# Patient Record
Sex: FEMALE | Race: WHITE | NOT HISPANIC OR LATINO | Employment: OTHER | URBAN - METROPOLITAN AREA
[De-identification: names, ages, dates, MRNs, and addresses within clinical notes are randomized per-mention and may not be internally consistent; named-entity substitution may affect disease eponyms.]

---

## 2017-07-27 ENCOUNTER — HISTORICAL (OUTPATIENT)
Dept: ADMINISTRATIVE | Facility: HOSPITAL | Age: 72
End: 2017-07-27

## 2017-07-29 LAB — FINAL CULTURE: NORMAL

## 2022-08-16 ENCOUNTER — OFFICE VISIT (OUTPATIENT)
Dept: URGENT CARE | Facility: CLINIC | Age: 77
End: 2022-08-16
Payer: MEDICARE

## 2022-08-16 VITALS
WEIGHT: 169 LBS | HEART RATE: 62 BPM | OXYGEN SATURATION: 96 % | TEMPERATURE: 100 F | DIASTOLIC BLOOD PRESSURE: 85 MMHG | HEIGHT: 72 IN | BODY MASS INDEX: 22.89 KG/M2 | RESPIRATION RATE: 17 BRPM | SYSTOLIC BLOOD PRESSURE: 144 MMHG

## 2022-08-16 DIAGNOSIS — R50.9 FEVER, UNSPECIFIED FEVER CAUSE: Primary | ICD-10-CM

## 2022-08-16 DIAGNOSIS — U07.1 COVID: ICD-10-CM

## 2022-08-16 DIAGNOSIS — U07.1 COVID-19 VIRUS DETECTED: ICD-10-CM

## 2022-08-16 LAB
CTP QC/QA: YES
SARS-COV-2 RDRP RESP QL NAA+PROBE: POSITIVE

## 2022-08-16 PROCEDURE — U0002 COVID-19 LAB TEST NON-CDC: HCPCS | Mod: QW,CR,, | Performed by: FAMILY MEDICINE

## 2022-08-16 PROCEDURE — 99213 OFFICE O/P EST LOW 20 MIN: CPT | Mod: CR,,, | Performed by: FAMILY MEDICINE

## 2022-08-16 PROCEDURE — 99213 PR OFFICE/OUTPT VISIT, EST, LEVL III, 20-29 MIN: ICD-10-PCS | Mod: CR,,, | Performed by: FAMILY MEDICINE

## 2022-08-16 PROCEDURE — U0002: ICD-10-PCS | Mod: QW,CR,, | Performed by: FAMILY MEDICINE

## 2022-08-16 RX ORDER — MIRABEGRON 50 MG/1
1 TABLET, FILM COATED, EXTENDED RELEASE ORAL DAILY
COMMUNITY
Start: 2022-07-28

## 2022-08-16 RX ORDER — BUPROPION HYDROCHLORIDE 150 MG/1
150 TABLET ORAL DAILY PRN
COMMUNITY
Start: 2022-06-22

## 2022-08-16 RX ORDER — FLUTICASONE PROPIONATE 50 MCG
SPRAY, SUSPENSION (ML) NASAL
COMMUNITY
Start: 2022-03-31

## 2022-08-16 RX ORDER — ERGOCALCIFEROL (VITAMIN D2) 10 MCG
5000 TABLET ORAL
COMMUNITY

## 2022-08-16 RX ORDER — OMEPRAZOLE 20 MG/1
20 CAPSULE, DELAYED RELEASE ORAL DAILY
COMMUNITY
Start: 2022-07-29

## 2022-08-16 RX ORDER — AMLODIPINE AND BENAZEPRIL HYDROCHLORIDE 5; 10 MG/1; MG/1
1 CAPSULE ORAL DAILY
COMMUNITY
Start: 2022-08-05

## 2022-08-16 RX ORDER — AMITRIPTYLINE HYDROCHLORIDE 50 MG/1
50 TABLET, FILM COATED ORAL
COMMUNITY
Start: 2022-03-31

## 2022-08-16 RX ORDER — METHIMAZOLE 10 MG/1
20 TABLET ORAL DAILY
COMMUNITY
Start: 2022-05-04

## 2022-08-16 RX ORDER — ATENOLOL 25 MG/1
25 TABLET ORAL DAILY
COMMUNITY
Start: 2022-05-03

## 2022-08-16 RX ORDER — ROSUVASTATIN CALCIUM 10 MG/1
10 TABLET, COATED ORAL DAILY
COMMUNITY
Start: 2022-05-31

## 2022-08-16 NOTE — PROGRESS NOTES
"Subjective:       Patient ID: Melanie Soliz is a 77 y.o. female.    Vitals:  height is 6' 3" (1.905 m) and weight is 76.7 kg (169 lb). Her temperature is 100.3 °F (37.9 °C). Her blood pressure is 144/85 (abnormal) and her pulse is 62. Her respiration is 17 and oxygen saturation is 96%.     Chief Complaint: Cough, Nasal Congestion, Fever, Headache, Weakness, and Fatigue (77 y.o. female presents to clinic with cough, weakness, fatigue , headache, fever 100.3, congestion for 2 days. Pt was exposed to covid by son. )    77 y.o. female presents to clinic with cough, weakness, fatigue , headache, fever 100.3, congestion for 2 days. Pt was exposed to covid by son.  Denies any shortness of breath vomiting or diarrhea.  Patient is COVID vaccinated in boosted x2    Cough  This is a new problem. The current episode started in the past 7 days. The cough is productive of sputum. Associated symptoms include a fever and headaches.   Fever   This is a new problem. The current episode started in the past 7 days. The maximum temperature noted was 100 to 100.9 F. Associated symptoms include coughing and headaches.   Headache   This is a new problem. The current episode started in the past 7 days. The pain is at a severity of 5/10. Associated symptoms include coughing and a fever.   Fatigue  This is a new problem. The current episode started in the past 7 days. Associated symptoms include coughing, fatigue, a fever and headaches.       Constitution: Positive for fatigue and fever.   HENT: Negative.    Cardiovascular: Negative.    Eyes: Negative.    Respiratory: Positive for cough.    Gastrointestinal: Negative.    Genitourinary: Negative.    Musculoskeletal: Negative.    Skin: Negative.    Allergic/Immunologic: Negative.    Neurological: Positive for headaches.   Hematologic/Lymphatic: Negative.        Objective:      Physical Exam   Constitutional: She is oriented to person, place, and time.   HENT:   Head: Normocephalic and " atraumatic.   Ears:   Right Ear: External ear normal.   Left Ear: External ear normal.   Mouth/Throat: No posterior oropharyngeal erythema.   Eyes: Conjunctivae are normal.   Pulmonary/Chest: Effort normal and breath sounds normal. No respiratory distress. She has no wheezes. She has no rhonchi. She has no rales.   Abdominal: Normal appearance.   Neurological: She is alert and oriented to person, place, and time.   Psychiatric: Her behavior is normal. Mood, judgment and thought content normal.   Vitals reviewed.        Assessment:       1. Fever, unspecified fever cause    2. COVID          Plan:       Medications sent to pharmacy  Stop taking your statin cholesterol medication while on paxlovid.  You can restart it days after you finish paxlovid  COVID Positive  Start vitamin C 1000mg twice a day, zinc 100mg once a day, and vitamin D3 at least 2000 units a day. Current CDC guidelines recommend that you quarantine for 5 days starting the day after your symptoms began. Quarantine can end after 5 days as long as the last 24 hours of quarantine you are fever free and there is improvement of all your symptoms. Wear a mask around others for 5 additional days after quarantine. Treat your symptoms as you would the common cold. If you live with anybody, isolate yourself in a separate bedroom and use a separate bathroom. If your symptoms worsen or you develop shortness of breath or a fever over 102.5 , seek medical attention immediately.     Fever, unspecified fever cause  -     POCT COVID-19 Rapid Screening    COVID    Other orders  -     nirmatrelvir-ritonavir 300 mg (150 mg x 2)-100 mg copackaged tablets (EUA); Take 3 tablets by mouth 2 (two) times daily. Each dose contains 2 nirmatrelvir (pink tablets) and 1 ritonavir (white tablet). Take all 3 tablets together  Dispense: 30 tablet; Refill: 0

## 2022-08-16 NOTE — PATIENT INSTRUCTIONS
Medications sent to pharmacy  Stop taking your statin cholesterol medication while on paxlovid.  You can restart it days after you finish paxlovid  COVID Positive  Start vitamin C 1000mg twice a day, zinc 100mg once a day, and vitamin D3 at least 2000 units a day. Current CDC guidelines recommend that you quarantine for 5 days starting the day after your symptoms began. Quarantine can end after 5 days as long as the last 24 hours of quarantine you are fever free and there is improvement of all your symptoms. Wear a mask around others for 5 additional days after quarantine. Treat your symptoms as you would the common cold. If you live with anybody, isolate yourself in a separate bedroom and use a separate bathroom. If your symptoms worsen or you develop shortness of breath or a fever over 102.5 , seek medical attention immediately.

## 2023-04-06 ENCOUNTER — OFFICE VISIT (OUTPATIENT)
Dept: URGENT CARE | Facility: CLINIC | Age: 78
End: 2023-04-06
Payer: MEDICARE

## 2023-04-06 VITALS
TEMPERATURE: 99 F | OXYGEN SATURATION: 98 % | HEIGHT: 63 IN | WEIGHT: 165 LBS | BODY MASS INDEX: 29.23 KG/M2 | DIASTOLIC BLOOD PRESSURE: 88 MMHG | RESPIRATION RATE: 18 BRPM | SYSTOLIC BLOOD PRESSURE: 139 MMHG | HEART RATE: 85 BPM

## 2023-04-06 DIAGNOSIS — R05.9 COUGH, UNSPECIFIED TYPE: ICD-10-CM

## 2023-04-06 DIAGNOSIS — J32.9 SINUSITIS, UNSPECIFIED CHRONICITY, UNSPECIFIED LOCATION: Primary | ICD-10-CM

## 2023-04-06 LAB
CTP QC/QA: YES
CTP QC/QA: YES
POC MOLECULAR INFLUENZA A AGN: NEGATIVE
POC MOLECULAR INFLUENZA B AGN: NEGATIVE
SARS-COV-2 RDRP RESP QL NAA+PROBE: NEGATIVE

## 2023-04-06 PROCEDURE — 87635: ICD-10-PCS | Mod: QW,CR,,

## 2023-04-06 PROCEDURE — 87502 INFLUENZA DNA AMP PROBE: CPT | Mod: QW,,,

## 2023-04-06 PROCEDURE — 87635 SARS-COV-2 COVID-19 AMP PRB: CPT | Mod: QW,CR,,

## 2023-04-06 PROCEDURE — 87502 POCT INFLUENZA A/B MOLECULAR: ICD-10-PCS | Mod: QW,,,

## 2023-04-06 PROCEDURE — 99214 PR OFFICE/OUTPT VISIT, EST, LEVL IV, 30-39 MIN: ICD-10-PCS | Mod: ,,,

## 2023-04-06 PROCEDURE — 99214 OFFICE O/P EST MOD 30 MIN: CPT | Mod: ,,,

## 2023-04-06 RX ORDER — BENZONATATE 200 MG/1
200 CAPSULE ORAL 3 TIMES DAILY PRN
Qty: 21 CAPSULE | Refills: 0 | Status: SHIPPED | OUTPATIENT
Start: 2023-04-06 | End: 2023-04-16

## 2023-04-06 RX ORDER — AMOXICILLIN AND CLAVULANATE POTASSIUM 875; 125 MG/1; MG/1
1 TABLET, FILM COATED ORAL EVERY 12 HOURS
Qty: 14 TABLET | Refills: 0 | Status: SHIPPED | OUTPATIENT
Start: 2023-04-06 | End: 2023-04-13

## 2023-04-06 NOTE — PROGRESS NOTES
"Subjective:      Patient ID: Melanie Soliz is a 78 y.o. female.    Vitals:  height is 5' 3" (1.6 m) and weight is 74.8 kg (165 lb). Her oral temperature is 98.5 °F (36.9 °C). Her blood pressure is 139/88 and her pulse is 85. Her respiration is 18 and oxygen saturation is 98%.     Chief Complaint: Cough     Patient is a 78 y.o. female with past medical history of anxiety, depression, Graves disease, GERD, hypertension, hyperlipidemia who presents to urgent care with complaints of cough, nasal congestion, postnasal drip, with some maxillary sinus pressure intermittently over the last 3 months. She reports intermittent dizziness x1 month, reports she has a history of vertigo and this dizziness is associated with position changes in going from lying.  Denies headache, injury, weakness, paresthesias, numbness tingling, visual changes.  Patient reports initial improvement of symptoms however they recur again within approximately 2 weeks..  She does report cough sometimes worsens at night with coughing fits, discomfort with cough.  She reports cough is worsened over the last few days with associated body aches.  Alleviating factors include lozenges, advil and tylenol  with mild amount of relief. Patient denies known fever , neck stiffness, rash, GI symptoms, shortness of breath, chest.  She reports she wanted to be evaluated for fluid COVID prior to having [facility] celebration this weekend.    ROS   Objective:     Physical Exam   Constitutional: She is oriented to person, place, and time. She appears well-developed. She is cooperative.  Non-toxic appearance. She does not appear ill. No distress.   HENT:   Head: Normocephalic and atraumatic.   Ears:   Right Ear: Hearing, tympanic membrane, external ear and ear canal normal.   Left Ear: Hearing, tympanic membrane, external ear and ear canal normal.   Nose: Nose normal. No mucosal edema, rhinorrhea or nasal deformity. No epistaxis. Right sinus exhibits no maxillary sinus " tenderness and no frontal sinus tenderness. Left sinus exhibits no maxillary sinus tenderness and no frontal sinus tenderness.   Mouth/Throat: Uvula is midline, oropharynx is clear and moist and mucous membranes are normal. Mucous membranes are moist. No trismus in the jaw. Normal dentition. No uvula swelling. No oropharyngeal exudate, posterior oropharyngeal edema or posterior oropharyngeal erythema.      Comments: Cobblestoning, clear postnasal drip  Eyes: Conjunctivae and lids are normal. Pupils are equal, round, and reactive to light. No scleral icterus. Extraocular movement intact   Neck: Trachea normal and phonation normal. Neck supple. No edema present. No erythema present. No neck rigidity present.   Cardiovascular: Normal rate, regular rhythm, normal heart sounds and normal pulses.   Pulmonary/Chest: Effort normal and breath sounds normal. No respiratory distress. She has no decreased breath sounds. She has no wheezes. She has no rhonchi. She has no rales.   Abdominal: Normal appearance.   Musculoskeletal: Normal range of motion.         General: No deformity. Normal range of motion.   Lymphadenopathy:     She has no cervical adenopathy.   Neurological: no focal deficit. She is alert, oriented to person, place, and time and at baseline. She displays no weakness. No sensory deficit. She exhibits normal muscle tone. Coordination and gait normal.   Skin: Skin is warm, dry, intact, not diaphoretic and not pale.   Psychiatric: Her speech is normal and behavior is normal. Judgment and thought content normal.   Nursing note and vitals reviewed.  Chest x-ray:No acute cardiopulmonary process.     Assessment:     1. Sinusitis, unspecified chronicity, unspecified location    2. Cough, unspecified type        Plan:       Sinusitis, unspecified chronicity, unspecified location  -     amoxicillin-clavulanate 875-125mg (AUGMENTIN) 875-125 mg per tablet; Take 1 tablet by mouth every 12 (twelve) hours. for 7 days  Dispense:  14 tablet; Refill: 0    Cough, unspecified type  -     POCT COVID-19 Rapid Screening  -     POCT Influenza A/B Molecular  -     X-Ray Chest PA And Lateral  -     benzonatate (TESSALON) 200 MG capsule; Take 1 capsule (200 mg total) by mouth 3 (three) times daily as needed for Cough.  Dispense: 21 capsule; Refill: 0    Due to recurrence of symptoms, body aches of last few days will treat with Augmentin for sinusitis    Negative flu and COVID  Negative chest x-ray        Drink plenty of fluids. Get plenty of rest.   Claritin or Zyrtec 10 mg for nasal congestion.  Nasal spray such as Nasacort or Flonase for congestion.  Over-the-counter cough medication such as Mucinex, Robitussin as needed.  Warm saltwater gargles for sore throat.   Tylenol as needed for sore throat and fever.     Call or return to clinic as needed   Go to the ER with any significant change or worsening of symptoms.   Follow up with your primary care doctor.

## 2023-04-06 NOTE — PATIENT INSTRUCTIONS
Negative flu and COVID  Negative chest x-ray        Drink plenty of fluids. Get plenty of rest.   Claritin or Zyrtec 10 mg for nasal congestion.  Nasal spray such as Nasacort or Flonase for congestion.  Over-the-counter cough medication such as Mucinex, Robitussin as needed.  Warm saltwater gargles for sore throat.   Tylenol as needed for sore throat and fever.     Call or return to clinic as needed   Go to the ER with any significant change or worsening of symptoms.   Follow up with your primary care doctor.

## 2023-09-01 ENCOUNTER — OFFICE VISIT (OUTPATIENT)
Dept: URGENT CARE | Facility: CLINIC | Age: 78
End: 2023-09-01
Payer: MEDICARE

## 2023-09-01 VITALS
WEIGHT: 165 LBS | HEART RATE: 96 BPM | RESPIRATION RATE: 18 BRPM | BODY MASS INDEX: 29.23 KG/M2 | HEIGHT: 63 IN | OXYGEN SATURATION: 96 % | TEMPERATURE: 99 F | SYSTOLIC BLOOD PRESSURE: 133 MMHG | DIASTOLIC BLOOD PRESSURE: 71 MMHG

## 2023-09-01 DIAGNOSIS — U07.1 COVID-19: Primary | ICD-10-CM

## 2023-09-01 DIAGNOSIS — R05.9 COUGH, UNSPECIFIED TYPE: ICD-10-CM

## 2023-09-01 DIAGNOSIS — U07.1 COVID-19 VIRUS DETECTED: ICD-10-CM

## 2023-09-01 LAB
CTP QC/QA: YES
SARS-COV-2 RDRP RESP QL NAA+PROBE: POSITIVE

## 2023-09-01 PROCEDURE — 99213 PR OFFICE/OUTPT VISIT, EST, LEVL III, 20-29 MIN: ICD-10-PCS | Mod: ,,,

## 2023-09-01 PROCEDURE — 99213 OFFICE O/P EST LOW 20 MIN: CPT | Mod: ,,,

## 2023-09-01 PROCEDURE — 87635 SARS-COV-2 COVID-19 AMP PRB: CPT | Mod: QW,,,

## 2023-09-01 PROCEDURE — 87635: ICD-10-PCS | Mod: QW,,,

## 2023-09-01 RX ORDER — BENZONATATE 200 MG/1
200 CAPSULE ORAL 3 TIMES DAILY PRN
Qty: 20 CAPSULE | Refills: 0 | Status: SHIPPED | OUTPATIENT
Start: 2023-09-01 | End: 2023-09-11

## 2023-09-01 NOTE — PROGRESS NOTES
"Subjective:      Patient ID: Melanie Soliz is a 78 y.o. female.    Vitals:  height is 5' 3" (1.6 m) and weight is 74.8 kg (165 lb). Her temperature is 99 °F (37.2 °C). Her blood pressure is 133/71 and her pulse is 96. Her respiration is 18 and oxygen saturation is 96%.     Chief Complaint: Generalized Body Aches     Patient is a 78 y.o. female who presents to urgent care with complaints of fever, cough, lethargy, body aches x2 days. Positive at home Covid test. Alleviating factors include Shae seltzer with mild amount of relief. Patient denies shortness of breath, chest pain, wheezing, nausea/vomiting, diarrhea.        Constitution: Positive for fatigue and fever.   Respiratory:  Positive for cough.       Objective:     Physical Exam   Constitutional: She is oriented to person, place, and time.  Non-toxic appearance. She does not appear ill.   HENT:   Ears:   Right Ear: Tympanic membrane, external ear and ear canal normal.   Left Ear: Tympanic membrane, external ear and ear canal normal.   Nose: Congestion present.   Mouth/Throat: Mucous membranes are moist. Oropharynx is clear.   Cardiovascular: Normal rate and normal pulses.   Pulmonary/Chest: Effort normal and breath sounds normal.   Abdominal: Normal appearance and bowel sounds are normal. Soft. There is no abdominal tenderness.   Musculoskeletal: Normal range of motion.         General: Normal range of motion.   Neurological: She is alert and oriented to person, place, and time.   Skin: Skin is warm and dry. Capillary refill takes less than 2 seconds.   Psychiatric: Her behavior is normal. Mood normal.       Assessment:     1. COVID-19    2. Cough, unspecified type        Plan:       COVID-19  -     molnupiravir 200 mg capsule (EUA); Take 4 capsules (800 mg total) by mouth every 12 (twelve) hours. for 5 days  Dispense: 40 capsule; Refill: 0    Cough, unspecified type  -     POCT COVID-19 Rapid Screening  -     benzonatate (TESSALON) 200 MG capsule; Take 1 " capsule (200 mg total) by mouth 3 (three) times daily as needed for Cough.  Dispense: 20 capsule; Refill: 0      COVID-Take Tylenol (acetaminophen) for fever/aches.  Drink plenty of fluids.     Molnupiravir (Lagevrio) is a treatment issued with COVID with-in 5 days of symptom onset to decrease risk of hospitalization and lessen symptom impact. Side effects from this treatment are very rare but may include nausea and diarrhea in 2% of patients.     Take OTC cough/cold/congestion medication such as Dayquil/Nyquil.    May also take antihistamine such as Claritin/Zyrtec/Allegra.  Cepacol sore throat lozenges if needed.     Drink plenty of fluids.  Rest.     Go directly to ER if you experience any SOB, chest pain, or other worrisome symptoms.      If positive for Covid-19, you should stay in isolation until: 1) At least 5 days have passed since your symptoms first appeared and  2) At least 24 hours have passed since recovery defined as resolution of fever without the use of fever-reducing medications and improvement in symptoms

## 2025-04-26 ENCOUNTER — OFFICE VISIT (OUTPATIENT)
Dept: URGENT CARE | Facility: CLINIC | Age: 80
End: 2025-04-26
Payer: MEDICARE

## 2025-04-26 VITALS
BODY MASS INDEX: 28.88 KG/M2 | DIASTOLIC BLOOD PRESSURE: 84 MMHG | OXYGEN SATURATION: 99 % | HEIGHT: 63 IN | WEIGHT: 163 LBS | HEART RATE: 104 BPM | TEMPERATURE: 98 F | RESPIRATION RATE: 18 BRPM | SYSTOLIC BLOOD PRESSURE: 128 MMHG

## 2025-04-26 DIAGNOSIS — N30.00 ACUTE CYSTITIS WITHOUT HEMATURIA: ICD-10-CM

## 2025-04-26 DIAGNOSIS — R30.0 DYSURIA: Primary | ICD-10-CM

## 2025-04-26 LAB
BILIRUB UR QL STRIP: NEGATIVE
GLUCOSE UR QL STRIP: POSITIVE
KETONES UR QL STRIP: NEGATIVE
LEUKOCYTE ESTERASE UR QL STRIP: POSITIVE
PH, POC UA: 5
POC BLOOD, URINE: NEGATIVE
POC NITRATES, URINE: POSITIVE
PROT UR QL STRIP: NEGATIVE
SP GR UR STRIP: 1.01 (ref 1–1.03)
UROBILINOGEN UR STRIP-ACNC: 0.1 (ref 0.1–1.1)

## 2025-04-26 PROCEDURE — 81003 URINALYSIS AUTO W/O SCOPE: CPT | Mod: QW,,, | Performed by: NUTRITIONIST

## 2025-04-26 PROCEDURE — 87077 CULTURE AEROBIC IDENTIFY: CPT | Performed by: NUTRITIONIST

## 2025-04-26 PROCEDURE — 99213 OFFICE O/P EST LOW 20 MIN: CPT | Mod: ,,, | Performed by: NUTRITIONIST

## 2025-04-26 RX ORDER — ASPIRIN 81 MG/1
81 TABLET ORAL
COMMUNITY
Start: 2025-02-14

## 2025-04-26 RX ORDER — PHENAZOPYRIDINE HYDROCHLORIDE 100 MG/1
100 TABLET, FILM COATED ORAL 3 TIMES DAILY PRN
Qty: 6 TABLET | Refills: 0 | Status: SHIPPED | OUTPATIENT
Start: 2025-04-26 | End: 2025-04-28

## 2025-04-26 RX ORDER — SULFAMETHOXAZOLE AND TRIMETHOPRIM 800; 160 MG/1; MG/1
1 TABLET ORAL 2 TIMES DAILY
Qty: 20 TABLET | Refills: 0 | Status: SHIPPED | OUTPATIENT
Start: 2025-04-26 | End: 2025-05-06

## 2025-04-26 RX ORDER — CALCIUM CARBONATE/VITAMIN D3 600 MG-10
200 TABLET ORAL EVERY MORNING
COMMUNITY
Start: 2025-02-20

## 2025-04-26 NOTE — PATIENT INSTRUCTIONS
Dysuria      UA:  Positive for UTI, leukocytes present.    Medications sent to pharmacy.      Medication sent to pharmacy.  We will culture urine and call you with the results when they become available.  Treatment will be adjusted if needed.  Monitor for fever.  Hydrate.  If your symptoms persist or worsen or you develop fever back pain or belly pain return to clinic or seek medical attention immediately   Strict ER precautions were given to patient.  Patient verbalized understanding.

## 2025-04-26 NOTE — PROGRESS NOTES
"Subjective:      Patient ID: Melanie Soliz is a 80 y.o. female.    Vitals:  height is 5' 3" (1.6 m) and weight is 73.9 kg (163 lb). Her oral temperature is 98.4 °F (36.9 °C). Her blood pressure is 128/84 and her pulse is 104. Her respiration is 18 and oxygen saturation is 99%.     Chief Complaint: Dysuria     Patient is a 80 y.o. female who presents to urgent care with complaints of burning with urination x 2 days. Pt states she gets uti's frequently.  Patient denies any allergies to medications.  Patient has no other associated symptoms.  Denies chest pain, shortness of breath dyspnea exertion palpitations.      Constitution: Negative for chills, fatigue and fever.   Cardiovascular:  Negative for chest pain.   Respiratory:  Negative for shortness of breath and wheezing.    Gastrointestinal:  Negative for abdominal pain, vomiting and diarrhea.   Genitourinary:  Positive for dysuria, frequency and urgency. Negative for urine decreased, flank pain, bladder incontinence, hematuria, vaginal discharge and pelvic pain.   Skin:  Negative for rash.      Objective:        Previous History      Review of patient's allergies indicates:  No Known Allergies    Past Medical History:   Diagnosis Date    Anxiety     Depression     Encephalopathy     GERD (gastroesophageal reflux disease)     Graves disease     Graves disease     Hyperlipidemia     Hypertension     Unspecified osteoarthritis, unspecified site      Current Outpatient Medications   Medication Instructions    amitriptyline (ELAVIL) 50 mg    amlodipine-benazepril 5-10 mg (LOTREL) 5-10 mg per capsule 1 capsule, Daily    aspirin (ECOTRIN) 81 mg    atenoloL (TENORMIN) 25 mg, Daily    buPROPion (WELLBUTRIN XL) 150 mg, Daily PRN    ergocalciferol (vitamin D2) 5,000 Units    fluticasone propionate (FLONASE) 50 mcg/actuation nasal spray SMARTSIG:Both Nares Every 12 Hours    methIMAzole (TAPAZOLE) 20 mg, Daily    MYRBETRIQ 50 mg Tb24 1 tablet, Oral, Daily    omeprazole " "(PRILOSEC) 20 mg, Daily    phenazopyridine (PYRIDIUM) 100 mg, Oral, 3 times daily PRN    rosuvastatin (CRESTOR) 10 mg, Daily    sulfamethoxazole-trimethoprim 800-160mg (BACTRIM DS) 800-160 mg Tab 1 tablet, Oral, 2 times daily    VITAMIN B-1 (MONONITRATE) 200 mg, Every morning     Past Surgical History:   Procedure Laterality Date    HERNIA REPAIR      HYSTERECTOMY      THYROIDECTOMY       Family History   Problem Relation Name Age of Onset    No Known Problems Mother      No Known Problems Father         Social History[1]     Physical Exam      Vital Signs Reviewed   /84   Pulse 104   Temp 98.4 °F (36.9 °C) (Oral)   Resp 18   Ht 5' 3" (1.6 m)   Wt 73.9 kg (163 lb)   SpO2 99%   BMI 28.87 kg/m²        Procedures    Procedures     Labs     Results for orders placed or performed in visit on 04/26/25   POCT Urinalysis, Dipstick, Automated, W/O Scope    Collection Time: 04/26/25  9:38 AM   Result Value Ref Range    POC Blood, Urine Negative Negative    POC Bilirubin, Urine Negative Negative    POC Urobilinogen, Urine 0.1 0.1 - 1.1    POC Ketones, Urine Negative Negative    POC Protein, Urine Negative Negative    POC Nitrates, Urine Positive (A) Negative    POC Glucose, Urine Positive (A) Negative    pH, UA 5     POC Specific Gravity, Urine 1.015 1.003 - 1.029    POC Leukocytes, Urine Positive (A) Negative       Physical Exam   Constitutional:  Non-toxic appearance. She does not appear ill. No distress.   HENT:   Head: Normocephalic and atraumatic.   Neck: Neck supple.   Cardiovascular: Normal rate, normal heart sounds and normal pulses.   Pulmonary/Chest: Effort normal and breath sounds normal.   Abdominal: Normal appearance. She exhibits no distension. flat abdomen There is no abdominal tenderness. There is no rebound, no guarding, no left CVA tenderness and no right CVA tenderness.   Neurological: no focal deficit. She is alert.   Skin: Skin is warm, dry and not diaphoretic.   Psychiatric: Her behavior is " normal. Mood normal.   Nursing note and vitals reviewed.      Assessment:     1. Dysuria    2. Acute cystitis without hematuria        Plan:     Dysuria      UA:  Positive for UTI, leukocytes present.    Medications sent to pharmacy.      Medication sent to pharmacy.  We will culture urine and call you with the results when they become available.  Treatment will be adjusted if needed.  Monitor for fever.  Hydrate.  If your symptoms persist or worsen or you develop fever back pain or belly pain return to clinic or seek medical attention immediately   Strict ER precautions were given to patient.  Patient verbalized understanding.    Dysuria  -     POCT Urinalysis, Dipstick, Automated, W/O Scope  -     Urine Culture High Risk    Acute cystitis without hematuria  -     Urine Culture High Risk    Other orders  -     sulfamethoxazole-trimethoprim 800-160mg (BACTRIM DS) 800-160 mg Tab; Take 1 tablet by mouth 2 (two) times daily. for 10 days  Dispense: 20 tablet; Refill: 0  -     phenazopyridine (PYRIDIUM) 100 MG tablet; Take 1 tablet (100 mg total) by mouth 3 (three) times daily as needed for Pain.  Dispense: 6 tablet; Refill: 0                         [1]   Social History  Tobacco Use    Smoking status: Never     Passive exposure: Never    Smokeless tobacco: Never   Substance Use Topics    Alcohol use: Never    Drug use: Never

## 2025-04-27 ENCOUNTER — RESULTS FOLLOW-UP (OUTPATIENT)
Dept: URGENT CARE | Facility: CLINIC | Age: 80
End: 2025-04-27
Payer: MEDICARE

## 2025-04-28 LAB — BACTERIA UR CULT: ABNORMAL

## 2025-04-28 NOTE — PROGRESS NOTES
Urine culture final results with E coli bacteria sensitive to Bactrim antibiotic.  Plan to continue and finish antibiotic and follow-up with primary care physician for UTI monitoring and long-term care planning.